# Patient Record
Sex: MALE | Race: WHITE | NOT HISPANIC OR LATINO | Employment: FULL TIME | URBAN - METROPOLITAN AREA
[De-identification: names, ages, dates, MRNs, and addresses within clinical notes are randomized per-mention and may not be internally consistent; named-entity substitution may affect disease eponyms.]

---

## 2018-03-19 ENCOUNTER — CLINICAL SUPPORT (OUTPATIENT)
Dept: INTERNAL MEDICINE | Facility: CLINIC | Age: 33
End: 2018-03-19

## 2018-03-19 DIAGNOSIS — Z00.00 PHYSICAL EXAM: Primary | ICD-10-CM

## 2018-03-19 PROCEDURE — 99201 PR OFFICE/OUTPT VISIT,NEW,LEVL I: CPT | Mod: ,,, | Performed by: INTERNAL MEDICINE

## 2022-04-10 ENCOUNTER — HISTORICAL (OUTPATIENT)
Dept: ADMINISTRATIVE | Facility: HOSPITAL | Age: 37
End: 2022-04-10

## 2022-04-27 VITALS
DIASTOLIC BLOOD PRESSURE: 67 MMHG | OXYGEN SATURATION: 98 % | SYSTOLIC BLOOD PRESSURE: 118 MMHG | WEIGHT: 243 LBS | HEIGHT: 72 IN | BODY MASS INDEX: 32.91 KG/M2

## 2024-03-12 ENCOUNTER — OFFICE VISIT (OUTPATIENT)
Dept: FAMILY MEDICINE | Facility: CLINIC | Age: 39
End: 2024-03-12
Payer: COMMERCIAL

## 2024-03-12 VITALS
DIASTOLIC BLOOD PRESSURE: 78 MMHG | SYSTOLIC BLOOD PRESSURE: 130 MMHG | HEART RATE: 80 BPM | BODY MASS INDEX: 36.16 KG/M2 | WEIGHT: 267 LBS | TEMPERATURE: 99 F | HEIGHT: 72 IN | RESPIRATION RATE: 20 BRPM

## 2024-03-12 DIAGNOSIS — R21 RASH: Primary | ICD-10-CM

## 2024-03-12 PROCEDURE — 1159F MED LIST DOCD IN RCRD: CPT | Mod: CPTII,,, | Performed by: NURSE PRACTITIONER

## 2024-03-12 PROCEDURE — 3075F SYST BP GE 130 - 139MM HG: CPT | Mod: CPTII,,, | Performed by: NURSE PRACTITIONER

## 2024-03-12 PROCEDURE — 3078F DIAST BP <80 MM HG: CPT | Mod: CPTII,,, | Performed by: NURSE PRACTITIONER

## 2024-03-12 PROCEDURE — 99204 OFFICE O/P NEW MOD 45 MIN: CPT | Mod: ,,, | Performed by: NURSE PRACTITIONER

## 2024-03-12 PROCEDURE — 87070 CULTURE OTHR SPECIMN AEROBIC: CPT | Performed by: NURSE PRACTITIONER

## 2024-03-12 PROCEDURE — 3008F BODY MASS INDEX DOCD: CPT | Mod: CPTII,,, | Performed by: NURSE PRACTITIONER

## 2024-03-12 RX ORDER — TRIAMCINOLONE ACETONIDE 1 MG/G
OINTMENT TOPICAL 2 TIMES DAILY
Qty: 1 EACH | Refills: 0 | Status: SHIPPED | OUTPATIENT
Start: 2024-03-12

## 2024-03-12 RX ORDER — DOXYCYCLINE 100 MG/1
100 CAPSULE ORAL EVERY 12 HOURS
Qty: 20 CAPSULE | Refills: 0 | Status: SHIPPED | OUTPATIENT
Start: 2024-03-12 | End: 2024-03-22

## 2024-03-12 NOTE — PROGRESS NOTES
Subjective:       Patient ID: Be Jackson is a 38 y.o. male.    Chief Complaint: Rash (Rash to body; possible allergic reaction)      The patient 38-year-old with a rash. Patient has a skin eruption on his left lower back.  The patient states the edges of it itch.  There is an image of it in .    [place] Med[place]  The patient's finished it and states it did help the portion of the rash that in his groin area.  The patient has other areas of breakout his chest and on his hands.  Due to the way this is spreading I suspect it is bacterial.      Review of [id number] point review of systems conducted, negative except as stated in the history of present illness. See HPI for details.      Objective:      Visit Vitals  /78   Pulse 80   Temp 98.9 °F (37.2 °C)   Resp 20   Ht 6' (1.829 m)   Wt 121.1 kg (267 lb)   BMI 36.21 kg/m²     Physical Exam  Vitals and nursing note reviewed.   Constitutional:       Appearance: He is obese.   HENT:      Head: Normocephalic.      Nose: Nose normal.      Mouth/Throat:      Mouth: Mucous membranes are moist.   Eyes:      Extraocular Movements: Extraocular movements intact.   Cardiovascular:      Rate and Rhythm: Normal rate and regular rhythm.      Heart sounds: No murmur heard.  Pulmonary:      Effort: Pulmonary effort is normal.      Breath sounds: Normal breath sounds.   Abdominal:      General: Bowel sounds are normal.      Palpations: Abdomen is soft.   Musculoskeletal:         General: Normal range of motion.      Cervical back: Normal range of motion and neck supple.   Skin:     General: Skin is warm and dry.      Findings: Rash present.   Neurological:      Mental Status: He is alert and oriented to person, place, and time.       Current Outpatient Medications   Medication Instructions    doxycycline (VIBRAMYCIN) 100 mg, Oral, Every 12 hours     has No Known Allergies.       Assessment:         ICD-10-CM ICD-9-CM   1. Rash  R21  782.1          Plan:       1. Rash  Take cool showers  Pat skin dry  - doxycycline (VIBRAMYCIN) 100 MG Cap; Take 1 capsule (100 mg total) by mouth every 12 (twelve) hours. for 10 days  Dispense: 20 capsule; Refill: 0  - Wound Culture - cultured 1 lesion  Use triamcinolone cream as directed        Follow up in about 3 days (around 3/15/2024).     Future Appointments   Date Time Provider Department Center   3/15/2024  8:00 AM Marianne Benavidez NP [facility]

## 2024-03-14 ENCOUNTER — OFFICE VISIT (OUTPATIENT)
Dept: FAMILY MEDICINE | Facility: CLINIC | Age: 39
End: 2024-03-14
Payer: COMMERCIAL

## 2024-03-14 VITALS
DIASTOLIC BLOOD PRESSURE: 84 MMHG | RESPIRATION RATE: 20 BRPM | BODY MASS INDEX: 32.78 KG/M2 | HEART RATE: 87 BPM | WEIGHT: 242 LBS | TEMPERATURE: 98 F | SYSTOLIC BLOOD PRESSURE: 131 MMHG | HEIGHT: 72 IN

## 2024-03-14 DIAGNOSIS — R21 RASH: Primary | ICD-10-CM

## 2024-03-14 PROCEDURE — 99212 OFFICE O/P EST SF 10 MIN: CPT | Mod: ,,, | Performed by: NURSE PRACTITIONER

## 2024-03-14 PROCEDURE — 3079F DIAST BP 80-89 MM HG: CPT | Mod: CPTII,,, | Performed by: NURSE PRACTITIONER

## 2024-03-14 PROCEDURE — 3008F BODY MASS INDEX DOCD: CPT | Mod: CPTII,,, | Performed by: NURSE PRACTITIONER

## 2024-03-14 PROCEDURE — 3075F SYST BP GE 130 - 139MM HG: CPT | Mod: CPTII,,, | Performed by: NURSE PRACTITIONER

## 2024-03-14 PROCEDURE — 1159F MED LIST DOCD IN RCRD: CPT | Mod: CPTII,,, | Performed by: NURSE PRACTITIONER

## 2024-03-14 NOTE — PROGRESS NOTES
Subjective:       Patient ID: Be Jackson is a 38 y.o. male.    Chief Complaint: Rash (Follow up rash)    The patient presents with outbreak areas lower abdomen. Highly suspect this is fungal instead of bacterial.  Patient is seeing Dermatology tomorrow morning at 8:00 a.m..        Review of Systems 12 point review of systems conducted, negative except as stated in the history of present illness. See HPI for details.        Objective:        Visit Vitals  /84   Pulse 87   Temp 98.2 °F (36.8 °C)   Resp 20   Ht 6' (1.829 m)   Wt 109.8 kg (242 lb)   BMI 32.82 kg/m²        Physical Exam  Vitals and nursing note reviewed.   Constitutional:       Appearance: He is obese.   HENT:      Head: Normocephalic.      Nose: Nose normal.      Mouth/Throat:      Mouth: Mucous membranes are moist.   Eyes:      Extraocular Movements: Extraocular movements intact.      Conjunctiva/sclera: Conjunctivae normal.   Cardiovascular:      Rate and Rhythm: Normal rate and regular rhythm.      Pulses: Normal pulses.      Heart sounds: Normal heart sounds.   Pulmonary:      Effort: Pulmonary effort is normal.      Breath sounds: Normal breath sounds.   Abdominal:      General: Bowel sounds are normal.      Palpations: Abdomen is soft.   Musculoskeletal:         General: Normal range of motion.      Cervical back: Normal range of motion and neck supple.   Skin:     Findings: Rash present.   Neurological:      Mental Status: He is alert and oriented to person, place, and time.           Assessment:           ICD-10-CM ICD-9-CM   1. Rash  R21 782.1            Plan:         1. Rash  See Dermatology in the a.m..          No follow-ups on file.     No future appointments.     History reviewed. No pertinent past medical history.     Review of patient's allergies indicates:  No Known Allergies     Current Outpatient Medications   Medication Instructions    doxycycline (VIBRAMYCIN) 100 mg, Oral, Every 12 hours    triamcinolone acetonide 0.1%  (KENALOG) 0.1 % ointment Topical (Top), 2 times daily, Apply to itchy areas. Avoid face          Patient Active Problem List   Diagnosis    Rash           History reviewed. No pertinent past medical history.       History reviewed. No pertinent surgical history.        reports that he has been smoking cigarettes. He has been exposed to tobacco smoke. He has never used smokeless tobacco.      There is no immunization history on file for this patient.     Health Maintenance   Topic Date Due    Hepatitis C Screening  Never done    Lipid Panel  Never done    TETANUS VACCINE  04/15/2028

## 2024-03-16 LAB — BACTERIA WND CULT: NORMAL
